# Patient Record
Sex: FEMALE | Race: WHITE | NOT HISPANIC OR LATINO | Employment: OTHER | URBAN - METROPOLITAN AREA
[De-identification: names, ages, dates, MRNs, and addresses within clinical notes are randomized per-mention and may not be internally consistent; named-entity substitution may affect disease eponyms.]

---

## 2022-02-22 ENCOUNTER — HOSPITAL ENCOUNTER (EMERGENCY)
Facility: HOSPITAL | Age: 77
Discharge: HOME | End: 2022-02-22
Attending: EMERGENCY MEDICINE
Payer: MEDICARE

## 2022-02-22 VITALS
OXYGEN SATURATION: 98 % | RESPIRATION RATE: 15 BRPM | HEIGHT: 65 IN | DIASTOLIC BLOOD PRESSURE: 59 MMHG | HEART RATE: 85 BPM | TEMPERATURE: 98 F | SYSTOLIC BLOOD PRESSURE: 123 MMHG | BODY MASS INDEX: 22.63 KG/M2

## 2022-02-22 DIAGNOSIS — S01.01XA SCALP LACERATION, INITIAL ENCOUNTER: Primary | ICD-10-CM

## 2022-02-22 DIAGNOSIS — S09.90XA TRAUMATIC INJURY OF HEAD, INITIAL ENCOUNTER: ICD-10-CM

## 2022-02-22 PROCEDURE — 25000003 PHARM REV CODE 250: Performed by: EMERGENCY MEDICINE

## 2022-02-22 PROCEDURE — 90715 TDAP VACCINE 7 YRS/> IM: CPT | Performed by: EMERGENCY MEDICINE

## 2022-02-22 PROCEDURE — 12015 RPR F/E/E/N/L/M 7.6-12.5 CM: CPT

## 2022-02-22 PROCEDURE — 90471 IMMUNIZATION ADMIN: CPT | Performed by: EMERGENCY MEDICINE

## 2022-02-22 PROCEDURE — 99284 EMERGENCY DEPT VISIT MOD MDM: CPT | Mod: 25

## 2022-02-22 PROCEDURE — 63600175 PHARM REV CODE 636 W HCPCS: Performed by: EMERGENCY MEDICINE

## 2022-02-22 RX ORDER — OXYCODONE AND ACETAMINOPHEN 10; 325 MG/1; MG/1
1 TABLET ORAL
Status: COMPLETED | OUTPATIENT
Start: 2022-02-22 | End: 2022-02-22

## 2022-02-22 RX ORDER — ONDANSETRON 4 MG/1
4 TABLET, FILM COATED ORAL EVERY 6 HOURS PRN
Qty: 12 TABLET | Refills: 0 | Status: SHIPPED | OUTPATIENT
Start: 2022-02-22

## 2022-02-22 RX ORDER — ONDANSETRON 2 MG/ML
4 INJECTION INTRAMUSCULAR; INTRAVENOUS
Status: DISCONTINUED | OUTPATIENT
Start: 2022-02-22 | End: 2022-02-22

## 2022-02-22 RX ORDER — OXYCODONE AND ACETAMINOPHEN 10; 325 MG/1; MG/1
1 TABLET ORAL
Status: DISCONTINUED | OUTPATIENT
Start: 2022-02-22 | End: 2022-02-22

## 2022-02-22 RX ORDER — MORPHINE SULFATE 4 MG/ML
2 INJECTION, SOLUTION INTRAMUSCULAR; INTRAVENOUS
Status: DISCONTINUED | OUTPATIENT
Start: 2022-02-22 | End: 2022-02-22

## 2022-02-22 RX ADMIN — OXYCODONE AND ACETAMINOPHEN 1 TABLET: 325; 10 TABLET ORAL at 01:02

## 2022-02-22 RX ADMIN — TETANUS TOXOID, REDUCED DIPHTHERIA TOXOID AND ACELLULAR PERTUSSIS VACCINE, ADSORBED 0.5 ML: 5; 2.5; 8; 8; 2.5 SUSPENSION INTRAMUSCULAR at 12:02

## 2022-02-22 NOTE — ED NOTES
Pt head wound cleaned with warm water and hibiclens. Slow oozing blood from right side of laceration. Wet dressing replaced.

## 2022-02-22 NOTE — ED NOTES
Pt is a/o x 4. C/O bleeding to forehead. States she fell around 2 am today while doing something in her closet, she hit her head on a wooden shelf. Pt reports she was able to stop the bleeding around 4 am. Later this am she was trying to clean blood from her hair when the wound began bleeding again and she was unable to stop it. Pt noted to have oozing from the wound at this time. Wet dressing placed and secured with kerlex. Pt denies LOC or other concerning trauma with her fall. She does take plavix daily. Pt and  advised to use call bell if bleeding noted through dressing. Pt would like to contact her PCP to find out her tetanus status before receiving the vaccine here today. No hx of tetanus noted in epic. Awaiting transport to CT.  at bedside, call bell in reach.

## 2022-02-22 NOTE — ED PROVIDER NOTES
SCRIBE #1 NOTE: IGeorgette, am scribing for, and in the presence of, Nazia Hayes MD. I have scribed the entire note.      History      Chief Complaint   Patient presents with    Head Laceration     Large laceration to forehead d/t a fall last night; pt on plavix       Review of patient's allergies indicates:   Allergen Reactions    Ramipril Swelling    Tetracyclines Rash    Latex         HPI   HPI    2/22/2022, 11:38 AM   History obtained from the patient      History of Present Illness: Emily Fitzpatrick is a 76 y.o. female patient who presents to the Emergency Department for an evaluation of a forehead laceration that she sustained early this morning between 0200 and 0300. The pt reports that she hit her head on closet shelves last night and this is how she stained her injury.  She denies LOC or syncope after her head injury. The pt did not seek medical attention last night because the bleeding had stopped completely. This morning, the pt was brushing her hair which caused her bleeding to start again. Also, this morning she saw her PCP who referred her to the ER for treatment. Associated sxs include slight dizziness, slight light-headedness, and nausea. Symptoms are constant and moderate in severity. No mitigating or exacerbating factors reported. Patient denies any vomiting, LOC, syncope, numbness, weakness, fever, chills, CP, SOB, and all other sxs at this time. No prior Tx reported. The pt is on Plavix which she last took last night. The pt's  drove her to the ER. No further complaints or concerns at this time.         Arrival mode: Personal vehicle     PCP: Krista Bee MD       Past Medical History:  Past Medical History:   Diagnosis Date    Abnormal Pap smear of cervix     HX of class III    Endometriosis     Fibroids     Hypercholesterolemia     Hypertension     Leiomyoma of uterus     Menopausal syndrome        Past Surgical History:  Past Surgical History:   Procedure  Laterality Date    CORONARY ARTERY BYPASS GRAFT      JOINT REPLACEMENT      REDUCTION OF BOTH BREASTS      TOTAL ABDOMINAL HYSTERECTOMY  04/18/1986    Pelvic Mass, Endometriosis         Family History:  No family history on file.    Social History:  Social History     Tobacco Use    Smoking status: Never Smoker    Smokeless tobacco: Never Used   Substance and Sexual Activity    Alcohol use: Not Currently    Drug use: Not Currently    Sexual activity: Not Currently       ROS   Review of Systems   Constitutional: Negative for chills and fever.   HENT: Negative for sore throat.    Respiratory: Negative for shortness of breath.    Cardiovascular: Negative for chest pain.   Gastrointestinal: Positive for nausea. Negative for vomiting.   Genitourinary: Negative for dysuria.   Musculoskeletal: Negative for back pain.   Skin: Positive for wound (forehead laceration). Negative for rash.   Neurological: Positive for dizziness (slight) and light-headedness (slight). Negative for syncope, weakness and numbness.        (-) LOC   Hematological: Does not bruise/bleed easily.   All other systems reviewed and are negative.    Physical Exam      Initial Vitals [02/22/22 1127]   BP Pulse Resp Temp SpO2   118/69 85 20 98.4 °F (36.9 °C) 95 %      MAP       --          Physical Exam  Nursing Notes and Vital Signs Reviewed.  Constitutional: Patient is in no acute distress. Well-developed and well-nourished.  Head: Moderate sized hematoma to he mid forehead. Normocephalic.  Eyes: PERRL. EOM intact. Conjunctivae are not pale. No scleral icterus.  ENT: Mucous membranes are moist. Oropharynx is clear and symmetric.    Neck: Supple. Full ROM. No lymphadenopathy.  Cardiovascular: Regular rate. Regular rhythm. No murmurs, rubs, or gallops. Distal pulses are 2+ and symmetric.  Pulmonary/Chest: No respiratory distress. Clear to auscultation bilaterally. No wheezing or rales.  Abdominal: Soft and non-distended.  There is no tenderness.  No  "rebound, guarding, or rigidity.   Musculoskeletal: Moves all extremities. No obvious deformities. No edema.  Skin: Warm and dry. Moderate sized hematoma to he mid forehead. 12 cm laceration which appears to be superficial tot he mid forehead. Bleeding is well controlled. No foreign body or object noted.  Neurological:  Alert, awake, and appropriate.  Normal speech.  No acute focal neurological deficits are appreciated.  Psychiatric: Normal affect. Good eye contact. Appropriate in content.    ED Course    Lac Repair    Date/Time: 2/22/2022 1:51 PM  Performed by: Nazia Hayes MD  Authorized by: Nazia Hayes MD     Consent:     Consent obtained:  Verbal    Consent given by:  Patient    Risks, benefits, and alternatives were discussed: yes      Risks discussed:  Infection, pain, poor wound healing and poor cosmetic result  Universal protocol:     Procedure explained and questions answered to patient or proxy's satisfaction: yes      Relevant documents present and verified: yes      Patient identity confirmed:  Verbally with patient, arm band and hospital-assigned identification number  Anesthesia:     Anesthesia method:  None  Laceration details:     Location:  Face    Face location:  Forehead    Length (cm):  12  Treatment:     Wound cleansed with: hypocleanse and saline.    Amount of cleaning:  Standard    Visualized foreign bodies/material removed: no    Skin repair:     Repair method: Dermabond and Steri-Strips.  Post-procedure details:     Procedure completion:  Tolerated well, no immediate complications  Comments:      Dermabond and Steri-Strips were used secondary to wound being old      ED Vital Signs:  Vitals:    02/22/22 1127 02/22/22 1300 02/22/22 1411   BP: 118/69  (!) 123/59   Pulse: 85  85   Resp: 20 15 15   Temp: 98.4 °F (36.9 °C)     TempSrc: Oral     SpO2: 95%  98%   Height: 5' 5" (1.651 m)         Abnormal Lab Results:  Labs Reviewed - No data to display       Imaging Results:  Imaging Results  "         CT Head Without Contrast (Final result)  Result time 02/22/22 12:50:31    Final result by Danilo Moon MD (02/22/22 12:50:31)                 Impression:      1.  Motion artifact is present on a number of images.  Subtle abnormalities could be overlooked.  Negative for obvious acute intracranial process. Negative for hemorrhage, or skull fracture.    2.  Cerebral atrophy noted.  Intracranial atherosclerotic disease noted.  Small vessel ischemic changes noted.  Superior right frontal lobe encephalomalacia.    3.  2.1 cm partially calcified right superior lateral frontal parietal meningioma.    4.  Nonemergent findings as described above.  This includes chronic sinusitis changes of the right frontal sinus.    All CT scans at this facility are performed  using dose modulation techniques as appropriate to performed exam including the following:  automated exposure control; adjustment of mA and/or kV according to the patients size (this includes techniques or standardized protocols for targeted exams where dose is matched to indication/reason for exam: i.e. extremities or head);  iterative reconstruction technique.      Electronically signed by: Danilo Moon MD  Date:    02/22/2022  Time:    12:50             Narrative:    EXAMINATION:  CT HEAD WITHOUT CONTRAST    CLINICAL HISTORY:  Head trauma, moderate-severe;    TECHNIQUE:  Axial images through the brain and posterior fossa were obtained without the use of IV contrast.  Sagittal and coronal reconstructions are provided for review.    COMPARISON:  No comparison studies are available.    FINDINGS:  Motion artifact is present on a number of images.  Subtle abnormalities could be overlooked.  The ventricles are midline and the CSF spaces are prominent.  The gray-white matter junction is well preserved. Negative for intracranial vascular abnormalities. Negative for mass, mass effect, cerebral edema, hemorrhage or abnormal fluid collections.  Intracranial  atherosclerotic disease noted.  Small vessel ischemic changes noted.  There is a small amount of encephalomalacia within the superior right frontal lobe.    There is a partially calcified 2.1 cm meningioma within the right lateral frontal parietal region superiorly.  Falx lipomas and calcifications noted.    A bandage overlies the frontal scalp possible laceration.  The skull and scalp are otherwise intact.  Patchy ethmoid air cell disease.  Complete opacification of the right frontal sinus with thickening of the sinus walls consistent with chronic sinusitis.  Bilateral hearing aids in place.  The rest of the paranasal sinuses, mastoid air cells, middle ears and ear canals are clear. The globes are intact.  Postoperative changes to the lens regions.                                        The Emergency Provider reviewed the vital signs and test results, which are outlined above.    ED Discussion   11:38 AM: The pt is requesting pain medication at this time.    1:56 PM: Reassessed pt at this time.  Pt was given wound care instructions and head injury precautions. Sutures not placed due to wound being old, closed with steri strips at this time. Discussed with pt all pertinent ED information and results. Discussed pt dx and plan of tx. Gave pt all f/u and return to the ED instructions. All questions and concerns were addressed at this time. Pt expresses understanding of information and instructions, and is comfortable with plan to discharge. Pt is stable for discharge.    I discussed with patient and/or family/caretaker that evaluation in the ED does not suggest any emergent or life threatening medical conditions requiring immediate intervention beyond what was provided in the ED, and I believe patient is safe for discharge.  Regardless, an unremarkable evaluation in the ED does not preclude the development or presence of a serious of life threatening condition. As such, patient was instructed to return immediately for  any worsening or change in current symptoms.           ED Medication(s):  Medications   Tdap (BOOSTRIX) vaccine injection 0.5 mL (0.5 mLs Intramuscular Given 2/22/22 1251)   oxyCODONE-acetaminophen  mg per tablet 1 tablet (1 tablet Oral Given 2/22/22 1300)        Follow-up Information     Krista Bee MD. Schedule an appointment as soon as possible for a visit in 3 days.    Specialty: Internal Medicine  Why: For wound re-check, Return to the Emergency Room, If symptoms worsen  Contact information:  [address]  [place] [address]  [phone]                           Discharge Medication List as of 2/22/2022  1:52 PM      START taking these medications    Details   ondansetron (ZOFRAN) 4 MG tablet Take 1 tablet (4 mg total) by mouth every 6 (six) hours as needed for Nausea., Starting Tue 2/22/2022, Print              Medication List      START taking these medications    ondansetron 4 MG tablet  Commonly known as: ZOFRAN  Take 1 tablet (4 mg total) by mouth every 6 (six) hours as needed for Nausea.        ASK your doctor about these medications    albuterol 90 mcg/actuation inhaler  Commonly known as: PROVENTIL/VENTOLIN HFA     bumetanide 2 MG tablet  Commonly known as: BUMEX     clobetasoL 0.05 % cream  Commonly known as: TEMOVATE  Apply topically 2 (two) times daily. Apply twice a day for two weeks for active flare and itching, 3 times weekly at night when in remission .     clopidogreL 75 mg tablet  Commonly known as: PLAVIX     cyclobenzaprine 10 MG tablet  Commonly known as: FLEXERIL     diphenoxylate-atropine 2.5-0.025 mg 2.5-0.025 mg per tablet  Commonly known as: LOMOTIL     DULoxetine 30 MG capsule  Commonly known as: CYMBALTA     estradioL 2 MG tablet  Commonly known as: ESTRACE  Take 1 tablet (2 mg total) by mouth once daily.     isosorbide mononitrate 60 MG 24 hr tablet  Commonly known as: IMDUR     metoprolol tartrate 50 MG tablet  Commonly known as: LOPRESSOR      montelukast 10 mg tablet  Commonly known as: SINGULAIR           Where to Get Your Medications      You can get these medications from any pharmacy    Bring a paper prescription for each of these medications  · ondansetron 4 MG tablet           Medical Decision Making    Medical Decision Making:   Clinical Tests:   Radiological Study: Ordered and Reviewed           Scribe Attestation:   Scribe #1: I performed the above scribed service and the documentation accurately describes the services I performed. I attest to the accuracy of the note.    Attending:   Physician Attestation Statement for Scribe #1: I, Nazia Hayes MD, personally performed the services described in this documentation, as scribed by Georgette Birch, in my presence, and it is both accurate and complete.          Clinical Impression       ICD-10-CM ICD-9-CM   1. Scalp laceration, initial encounter  S01.01XA 873.0   2. Traumatic injury of head, initial encounter  S09.90XA 959.01       Disposition:   Disposition: Discharged  Condition: Stable         Nazia Hayes MD  02/22/22 8209